# Patient Record
Sex: FEMALE | URBAN - METROPOLITAN AREA
[De-identification: names, ages, dates, MRNs, and addresses within clinical notes are randomized per-mention and may not be internally consistent; named-entity substitution may affect disease eponyms.]

---

## 2021-08-04 ENCOUNTER — APPOINTMENT (OUTPATIENT)
Dept: URBAN - METROPOLITAN AREA CLINIC 256 | Age: 46
Setting detail: DERMATOLOGY
End: 2021-08-04

## 2021-08-04 VITALS — WEIGHT: 170 LBS | HEIGHT: 62 IN | RESPIRATION RATE: 16 BRPM

## 2021-08-04 DIAGNOSIS — L20.89 OTHER ATOPIC DERMATITIS: ICD-10-CM

## 2021-08-04 PROBLEM — L30.9 DERMATITIS, UNSPECIFIED: Status: ACTIVE | Noted: 2021-08-04

## 2021-08-04 PROBLEM — D48.5 NEOPLASM OF UNCERTAIN BEHAVIOR OF SKIN: Status: ACTIVE | Noted: 2021-08-04

## 2021-08-04 PROCEDURE — OTHER ADDITIONAL NOTES: OTHER

## 2021-08-04 PROCEDURE — OTHER BIOPSY BY SHAVE METHOD: OTHER

## 2021-08-04 PROCEDURE — 88305 TISSUE EXAM BY PATHOLOGIST: CPT

## 2021-08-04 PROCEDURE — 99203 OFFICE O/P NEW LOW 30 MIN: CPT | Mod: 25

## 2021-08-04 PROCEDURE — OTHER COUNSELING: OTHER

## 2021-08-04 PROCEDURE — OTHER PRESCRIPTION: OTHER

## 2021-08-04 PROCEDURE — OTHER PATHOLOGY BILLING: OTHER

## 2021-08-04 PROCEDURE — 11102 TANGNTL BX SKIN SINGLE LES: CPT

## 2021-08-04 RX ORDER — PREDNISONE 20 MG/1
TABLET ORAL
Qty: 18 | Refills: 0 | Status: ERX | COMMUNITY
Start: 2021-08-04

## 2021-08-04 RX ORDER — TRIAMCINOLONE ACETONIDE 1 MG/G
CREAM TOPICAL BID
Qty: 1 | Refills: 3 | Status: ERX | COMMUNITY
Start: 2021-08-04

## 2021-08-04 RX ORDER — DESONIDE 0.5 MG/G
CREAM TOPICAL BID
Qty: 1 | Refills: 3 | Status: ERX | COMMUNITY
Start: 2021-08-04

## 2021-08-04 ASSESSMENT — LOCATION DETAILED DESCRIPTION DERM
LOCATION DETAILED: LEFT PROXIMAL PRETIBIAL REGION
LOCATION DETAILED: RIGHT PROXIMAL PRETIBIAL REGION
LOCATION DETAILED: RIGHT ANTECUBITAL SKIN
LOCATION DETAILED: LEFT ANTECUBITAL SKIN

## 2021-08-04 ASSESSMENT — LOCATION SIMPLE DESCRIPTION DERM
LOCATION SIMPLE: LEFT PRETIBIAL REGION
LOCATION SIMPLE: RIGHT UPPER ARM
LOCATION SIMPLE: RIGHT PRETIBIAL REGION
LOCATION SIMPLE: LEFT UPPER ARM

## 2021-08-04 ASSESSMENT — LOCATION ZONE DERM
LOCATION ZONE: ARM
LOCATION ZONE: LEG

## 2021-08-04 NOTE — PROCEDURE: PATHOLOGY BILLING
Immunohistochemistry ([id number] and [id number]) billing is not performed here. Please use the Immunohistochemistry Stain Billing plan to accomplish this. Immunohistochemistry ([id number] and [id number]) billing is not performed here. Please use the Immunohistochemistry Stain Billing plan to accomplish this.

## 2021-08-04 NOTE — PROCEDURE: PATHOLOGY BILLING
Rendering Text In Billing: The slides will be read by [employer] and reported in the attached document. Rendering Text In Billing: The slides will be read by [employer] and reported in the attached document.

## 2021-08-04 NOTE — PROCEDURE: BIOPSY BY SHAVE METHOD
Bill For Surgical Tray: no
Hemostasis: Drysol
Information: Selecting Yes will display possible errors in your note based on the variables you have selected. This validation is only offered as a suggestion for you. PLEASE NOTE THAT THE VALIDATION TEXT WILL BE REMOVED WHEN YOU FINALIZE YOUR NOTE. IF YOU WANT TO FAX A PRELIMINARY NOTE YOU WILL NEED TO TOGGLE THIS TO 'NO' IF YOU DO NOT WANT IT IN YOUR FAXED NOTE.
Size Of Lesion In Cm: 0
Notification Instructions: Patient will be notified of biopsy results. However, patient instructed to call the office if not contacted within 2 weeks.
Electrodesiccation And Curettage Text: The wound bed was treated with electrodesiccation and curettage after the biopsy was performed.
Type Of Destruction Used: Curettage
Anesthesia Type: 1% lidocaine with epinephrine
Cryotherapy Text: The wound bed was treated with cryotherapy after the biopsy was performed.
Depth Of Biopsy: dermis
Dressing: bandage
Biopsy Type: H and E
Billing Type: Client Bill
Anesthesia Volume In Cc (Will Not Render If 0): 0.5
Detail Level: Detailed
Silver Nitrate Text: The wound bed was treated with silver nitrate after the biopsy was performed.
Was A Bandage Applied: Yes
Biopsy Method: Dermablade
Post-Care Instructions: I reviewed with the patient in detail post-care instructions. Patient is to keep the biopsy site dry overnight, and then apply bacitracin twice daily until healed. Patient may apply hydrogen peroxide soaks to remove any crusting.
Electrodesiccation Text: The wound bed was treated with electrodesiccation after the biopsy was performed.
Wound Care: Petrolatum
Consent: Written consent was obtained and risks were reviewed including but not limited to scarring, infection, bleeding, scabbing, incomplete removal, nerve damage and allergy to anesthesia.
Curettage Text: The wound bed was treated with curettage after the biopsy was performed.

## 2021-08-04 NOTE — HPI: RASH
What Type Of Note Output Would You Prefer (Optional)?: Standard Output
Is This A New Presentation, Or A Follow-Up?: Rash
Additional History: Rash for a few months. Started out a small spot on her shin. Very itchy. She has tried HC cream which helps and she takes Benadryl as well, also has done some ibuprofen. Also tried some lotion. She tried to change her antihistamine from zyrtec to Allegra and thinks this worsened it, then she switched back and it helped. 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 \\Rosario has a spot on her face that’s been there for years and it’s now itchy, she isn’t sure if it’s related or not. She treated it for ringworm and eczema and neither worked. She also tried neosporin but she is allergic to this.

## 2021-08-04 NOTE — PROCEDURE: ADDITIONAL NOTES
Render Risk Assessment In Note?: no
Detail Level: Simple
Additional Notes: Reviewed risks of prednisone including but not limited to increasing blood sugar (patient not diabetic), risk of irritability and ferny. Patient advised to take in AMs, discussed risk of insomnia. Patient wishes to proceed. Told patient to stop taking and call clinic right away if any severe SEs develop \\n\\nPatient may not take prednisone as she felt anxious about it in the past. Told her okay to trial topicals only. Discussed appears like follicular eczema on leg and more like psoriasis by eyes, may consider biopsy at follow up.\\n\\nA clinical assistant was present for the exam. Care instructions of biopsied site were explained to the patient in detail. Told patient to call with any concerns or questions. The patient verbalized understanding and agreement of the education provided and the treatment plan. Encouraged patient to schedule a follow up appointment right after visit. At the end of the visit, all questions had been answered and the patient was satisfied with the visit.

## 2021-08-18 ENCOUNTER — APPOINTMENT (OUTPATIENT)
Dept: URBAN - METROPOLITAN AREA CLINIC 253 | Age: 46
Setting detail: DERMATOLOGY
End: 2021-08-18

## 2021-08-18 VITALS — RESPIRATION RATE: 15 BRPM | WEIGHT: 170 LBS | HEIGHT: 62 IN

## 2021-08-18 DIAGNOSIS — D22 MELANOCYTIC NEVI: ICD-10-CM

## 2021-08-18 DIAGNOSIS — L24 IRRITANT CONTACT DERMATITIS: ICD-10-CM

## 2021-08-18 PROBLEM — L30.9 DERMATITIS, UNSPECIFIED: Status: ACTIVE | Noted: 2021-08-18

## 2021-08-18 PROBLEM — C44.319 BASAL CELL CARCINOMA OF SKIN OF OTHER PARTS OF FACE: Status: ACTIVE | Noted: 2021-08-18

## 2021-08-18 PROBLEM — D48.5 NEOPLASM OF UNCERTAIN BEHAVIOR OF SKIN: Status: ACTIVE | Noted: 2021-08-18

## 2021-08-18 PROCEDURE — OTHER PATHOLOGY BILLING: OTHER

## 2021-08-18 PROCEDURE — 88305 TISSUE EXAM BY PATHOLOGIST: CPT

## 2021-08-18 PROCEDURE — OTHER ADDITIONAL NOTES: OTHER

## 2021-08-18 PROCEDURE — 11103 TANGNTL BX SKIN EA SEP/ADDL: CPT

## 2021-08-18 PROCEDURE — 99213 OFFICE O/P EST LOW 20 MIN: CPT | Mod: 25

## 2021-08-18 PROCEDURE — OTHER BIOPSY BY SHAVE METHOD: OTHER

## 2021-08-18 PROCEDURE — 11102 TANGNTL BX SKIN SINGLE LES: CPT

## 2021-08-18 PROCEDURE — OTHER PRESCRIPTION: OTHER

## 2021-08-18 PROCEDURE — OTHER COUNSELING: OTHER

## 2021-08-18 RX ORDER — CLOBETASOL PROPIONATE 0.5 MG/G
0.05% CREAM TOPICAL BID
Qty: 1 | Refills: 3 | Status: ERX | COMMUNITY
Start: 2021-08-18

## 2021-08-18 ASSESSMENT — LOCATION SIMPLE DESCRIPTION DERM
LOCATION SIMPLE: LEFT PRETIBIAL REGION
LOCATION SIMPLE: RIGHT UPPER BACK
LOCATION SIMPLE: UPPER BACK

## 2021-08-18 ASSESSMENT — LOCATION DETAILED DESCRIPTION DERM
LOCATION DETAILED: RIGHT SUPERIOR UPPER BACK
LOCATION DETAILED: LEFT PROXIMAL PRETIBIAL REGION
LOCATION DETAILED: SUPERIOR THORACIC SPINE

## 2021-08-18 ASSESSMENT — LOCATION ZONE DERM
LOCATION ZONE: TRUNK
LOCATION ZONE: LEG

## 2021-08-18 NOTE — PROCEDURE: BIOPSY BY SHAVE METHOD
Validate Note Data (See Information Below): No
Was A Bandage Applied: Yes
Additional Anesthesia Volume In Cc (Will Not Render If 0): 0
Curettage Text: The wound bed was treated with curettage after the biopsy was performed.
Hemostasis: Drysol
Notification Instructions: Patient will be notified of biopsy results. However, patient instructed to call the office if not contacted within 2 weeks.
Wound Care: Petrolatum
Depth Of Biopsy: dermis
Biopsy Method: Dermablade
Biopsy Type: H and E
Electrodesiccation And Curettage Text: The wound bed was treated with electrodesiccation and curettage after the biopsy was performed.
Cryotherapy Text: The wound bed was treated with cryotherapy after the biopsy was performed.
Anesthesia Volume In Cc (Will Not Render If 0): 0.5
Dressing: bandage
Information: Selecting Yes will display possible errors in your note based on the variables you have selected. This validation is only offered as a suggestion for you. PLEASE NOTE THAT THE VALIDATION TEXT WILL BE REMOVED WHEN YOU FINALIZE YOUR NOTE. IF YOU WANT TO FAX A PRELIMINARY NOTE YOU WILL NEED TO TOGGLE THIS TO 'NO' IF YOU DO NOT WANT IT IN YOUR FAXED NOTE.
Silver Nitrate Text: The wound bed was treated with silver nitrate after the biopsy was performed.
Electrodesiccation Text: The wound bed was treated with electrodesiccation after the biopsy was performed.
Billing Type: Client Bill
Type Of Destruction Used: Curettage
Post-Care Instructions: I reviewed with the patient in detail post-care instructions. Patient is to keep the biopsy site dry overnight, and then apply bacitracin twice daily until healed. Patient may apply hydrogen peroxide soaks to remove any crusting.
Detail Level: Detailed
Consent: Written consent was obtained and risks were reviewed including but not limited to scarring, infection, bleeding, scabbing, incomplete removal, nerve damage and allergy to anesthesia.
Anesthesia Type: 1% lidocaine with epinephrine

## 2021-08-18 NOTE — PROCEDURE: PATHOLOGY BILLING
Rendering Text In Billing: The slides will be read by [employer] and reported in the attached document. Rendering Text In Billing: The slides will be read by [email or web address] and reported in the attached document.

## 2021-08-18 NOTE — PROCEDURE: ADDITIONAL NOTES
Additional Notes: Recommended FBE \\nPatient may do this with Mohs suture removal at [facility] as she lives in [place]\\n\\nA clinical assistant was present for the exam. Care instructions of biopsied sites were explained to the patient in detail. Told patient to call with any concerns or questions. The patient verbalized understanding and agreement of the education provided and the treatment plan. Encouraged patient to schedule a follow up appointment right after visit. At the end of the visit, all questions had been answered and the patient was satisfied with the visit. Additional Notes: Recommended FBE \\nPatient may do this with Mohs suture removal at [facility] as she lives in [place]\\n\\nA clinical assistant was present for the exam. Care instructions of biopsied sites were explained to the patient in detail. Told patient to call with any concerns or questions. The patient verbalized understanding and agreement of the education provided and the treatment plan. Encouraged patient to schedule a follow up appointment right after visit. At the end of the visit, all questions had been answered and the patient was satisfied with the visit.

## 2021-08-18 NOTE — HPI: RASH
What Type Of Note Output Would You Prefer (Optional)?: Standard Output
Is This A New Presentation, Or A Follow-Up?: Rash
Additional History: Rash follow up she did take the prednisone. She had no side effects until she was tapering off and then she had headaches and arm pain. She had some nausea from it as well. The rash is much better except for now there’s a new spot flaring up. She does come into contact with betadine and gets reactions from this. She has had reactions to detergents as well. She is allergic to lots of ingredients.

## 2021-08-18 NOTE — PROCEDURE: ADDITIONAL NOTES
Additional Notes: Continue with antihistamine. If not resolved or worsens and not relieved by topical steroids, RTC for reevaluation and will consider a  biopsy.

## 2021-08-18 NOTE — PROCEDURE: PATHOLOGY BILLING
Immunohistochemistry ([id number] and [id number]) billing is not performed here. Please use the Immunohistochemistry Stain Billing plan to accomplish this. Immunohistochemistry ([id number] and [id number]) billing is not performed here. Please use the Immunohistochemistry Stain Billing plan to accomplish this.

## 2021-08-18 NOTE — PROCEDURE: ADDITIONAL NOTES
Additional Notes: Discussed path results and mohs vs SRT. Patient prefers Mohs. Will be sure [facility] staff contacts her for scheduling. Recommended FBE.

## 2021-09-28 ENCOUNTER — APPOINTMENT (OUTPATIENT)
Dept: URBAN - METROPOLITAN AREA CLINIC 255 | Age: 46
Setting detail: DERMATOLOGY
End: 2021-10-09

## 2021-09-28 PROBLEM — C44.319 BASAL CELL CARCINOMA OF SKIN OF OTHER PARTS OF FACE: Status: ACTIVE | Noted: 2021-09-28

## 2021-09-28 PROCEDURE — 17312 MOHS ADDL STAGE: CPT

## 2021-09-28 PROCEDURE — 14041 TIS TRNFR F/C/C/M/N/A/G/H/F: CPT

## 2021-09-28 PROCEDURE — OTHER MIPS QUALITY: OTHER

## 2021-09-28 PROCEDURE — 17311 MOHS 1 STAGE H/N/HF/G: CPT

## 2021-09-28 PROCEDURE — OTHER MOHS SURGERY: OTHER

## 2021-09-28 NOTE — PROCEDURE: MIPS QUALITY
Quality 431: Preventive Care And Screening: Unhealthy Alcohol Use - Screening: Patient not identified as an unhealthy alcohol user when screened for unhealthy alcohol use using a systematic screening method
Quality 130: Documentation Of Current Medications In The Medical Record: Current Medications Documented
Detail Level: Detailed
Quality 143: Oncology: Medical And Radiation- Pain Intensity Quantified: Pain severity quantified, no pain present
Quality 110: Preventive Care And Screening: Influenza Immunization: Influenza Immunization previously received during influenza season
Quality 226: Preventive Care And Screening: Tobacco Use: Screening And Cessation Intervention: Patient screened for tobacco use and is an ex/non-smoker

## 2021-10-08 ENCOUNTER — APPOINTMENT (OUTPATIENT)
Dept: URBAN - METROPOLITAN AREA CLINIC 255 | Age: 46
Setting detail: DERMATOLOGY
End: 2021-10-09

## 2021-10-08 DIAGNOSIS — D22 MELANOCYTIC NEVI: ICD-10-CM

## 2021-10-08 DIAGNOSIS — L82.1 OTHER SEBORRHEIC KERATOSIS: ICD-10-CM

## 2021-10-08 DIAGNOSIS — Z48.02 ENCOUNTER FOR REMOVAL OF SUTURES: ICD-10-CM

## 2021-10-08 DIAGNOSIS — L81.4 OTHER MELANIN HYPERPIGMENTATION: ICD-10-CM

## 2021-10-08 DIAGNOSIS — Z86.006 PERSONAL HISTORY OF MELANOMA IN-SITU: ICD-10-CM

## 2021-10-08 DIAGNOSIS — Z85.820 PERSONAL HISTORY OF MALIGNANT MELANOMA OF SKIN: ICD-10-CM

## 2021-10-08 DIAGNOSIS — D18.0 HEMANGIOMA: ICD-10-CM

## 2021-10-08 DIAGNOSIS — Z85.828 PERSONAL HISTORY OF OTHER MALIGNANT NEOPLASM OF SKIN: ICD-10-CM

## 2021-10-08 PROBLEM — D23.72 OTHER BENIGN NEOPLASM OF SKIN OF LEFT LOWER LIMB, INCLUDING HIP: Status: ACTIVE | Noted: 2021-10-08

## 2021-10-08 PROBLEM — D22.5 MELANOCYTIC NEVI OF TRUNK: Status: ACTIVE | Noted: 2021-10-08

## 2021-10-08 PROBLEM — D18.01 HEMANGIOMA OF SKIN AND SUBCUTANEOUS TISSUE: Status: ACTIVE | Noted: 2021-10-08

## 2021-10-08 PROCEDURE — OTHER SUTURE REMOVAL (GLOBAL PERIOD): OTHER

## 2021-10-08 PROCEDURE — 99214 OFFICE O/P EST MOD 30 MIN: CPT

## 2021-10-08 PROCEDURE — OTHER MIPS QUALITY: OTHER

## 2021-10-08 PROCEDURE — OTHER COUNSELING: OTHER

## 2021-10-08 ASSESSMENT — LOCATION DETAILED DESCRIPTION DERM
LOCATION DETAILED: RIGHT SUPERIOR MEDIAL UPPER BACK
LOCATION DETAILED: LEFT SUPRAPUBIC SKIN
LOCATION DETAILED: RIGHT MEDIAL UPPER BACK
LOCATION DETAILED: RIGHT INFERIOR MEDIAL UPPER BACK
LOCATION DETAILED: SUPERIOR THORACIC SPINE
LOCATION DETAILED: LEFT INFERIOR FOREHEAD
LOCATION DETAILED: LEFT SUPERIOR MEDIAL UPPER BACK
LOCATION DETAILED: RIGHT SUPERIOR UPPER BACK

## 2021-10-08 ASSESSMENT — LOCATION SIMPLE DESCRIPTION DERM
LOCATION SIMPLE: LEFT UPPER BACK
LOCATION SIMPLE: LEFT FOREHEAD
LOCATION SIMPLE: UPPER BACK
LOCATION SIMPLE: RIGHT UPPER BACK
LOCATION SIMPLE: GROIN

## 2021-10-08 ASSESSMENT — LOCATION ZONE DERM
LOCATION ZONE: FACE
LOCATION ZONE: TRUNK
LOCATION ZONE: TRUNK

## 2021-10-08 NOTE — PROCEDURE: MIPS QUALITY
Detail Level: Detailed
Quality 226: Preventive Care And Screening: Tobacco Use: Screening And Cessation Intervention: Patient screened for tobacco use and is an ex/non-smoker
Quality 130: Documentation Of Current Medications In The Medical Record: Current Medications Documented
Quality 431: Preventive Care And Screening: Unhealthy Alcohol Use - Screening: Patient not identified as an unhealthy alcohol user when screened for unhealthy alcohol use using a systematic screening method
Quality 110: Preventive Care And Screening: Influenza Immunization: Influenza Immunization not Administered because Patient Refused.

## 2021-10-08 NOTE — PROCEDURE: SUTURE REMOVAL (GLOBAL PERIOD)
Body Location Override (Optional - Billing Will Still Be Based On Selected Body Map Location If Applicable): right forehead
Add 14988 Cpt? (Important Note: In 2017 The Use Of 95112 Is Being Tracked By Cms To Determine Future Global Period Reimbursement For Global Periods): no
Detail Level: Detailed

## 2022-01-10 ENCOUNTER — APPOINTMENT (OUTPATIENT)
Dept: URBAN - METROPOLITAN AREA CLINIC 255 | Age: 47
Setting detail: DERMATOLOGY
End: 2022-01-14

## 2022-01-10 DIAGNOSIS — D22 MELANOCYTIC NEVI: ICD-10-CM

## 2022-01-10 DIAGNOSIS — L81.4 OTHER MELANIN HYPERPIGMENTATION: ICD-10-CM

## 2022-01-10 DIAGNOSIS — L57.8 OTHER SKIN CHANGES DUE TO CHRONIC EXPOSURE TO NONIONIZING RADIATION: ICD-10-CM

## 2022-01-10 DIAGNOSIS — D18.0 HEMANGIOMA: ICD-10-CM

## 2022-01-10 DIAGNOSIS — L20.89 OTHER ATOPIC DERMATITIS: ICD-10-CM

## 2022-01-10 DIAGNOSIS — Z85.828 PERSONAL HISTORY OF OTHER MALIGNANT NEOPLASM OF SKIN: ICD-10-CM

## 2022-01-10 DIAGNOSIS — Z71.89 OTHER SPECIFIED COUNSELING: ICD-10-CM

## 2022-01-10 DIAGNOSIS — L82.1 OTHER SEBORRHEIC KERATOSIS: ICD-10-CM

## 2022-01-10 DIAGNOSIS — Z85.820 PERSONAL HISTORY OF MALIGNANT MELANOMA OF SKIN: ICD-10-CM

## 2022-01-10 DIAGNOSIS — Z86.006 PERSONAL HISTORY OF MELANOMA IN-SITU: ICD-10-CM

## 2022-01-10 PROBLEM — D18.01 HEMANGIOMA OF SKIN AND SUBCUTANEOUS TISSUE: Status: ACTIVE | Noted: 2022-01-10

## 2022-01-10 PROBLEM — D22.71 MELANOCYTIC NEVI OF RIGHT LOWER LIMB, INCLUDING HIP: Status: ACTIVE | Noted: 2022-01-10

## 2022-01-10 PROBLEM — D22.5 MELANOCYTIC NEVI OF TRUNK: Status: ACTIVE | Noted: 2022-01-10

## 2022-01-10 PROBLEM — L20.84 INTRINSIC (ALLERGIC) ECZEMA: Status: ACTIVE | Noted: 2022-01-10

## 2022-01-10 PROCEDURE — OTHER COUNSELING: OTHER

## 2022-01-10 PROCEDURE — OTHER DEFER: OTHER

## 2022-01-10 PROCEDURE — OTHER PRESCRIPTION MEDICATION MANAGEMENT: OTHER

## 2022-01-10 PROCEDURE — OTHER MIPS QUALITY: OTHER

## 2022-01-10 PROCEDURE — 99214 OFFICE O/P EST MOD 30 MIN: CPT

## 2022-01-10 ASSESSMENT — LOCATION SIMPLE DESCRIPTION DERM
LOCATION SIMPLE: LEFT PRETIBIAL REGION
LOCATION SIMPLE: UPPER BACK
LOCATION SIMPLE: RIGHT GREAT TOE
LOCATION SIMPLE: RIGHT HAND
LOCATION SIMPLE: LEFT FOREHEAD
LOCATION SIMPLE: RIGHT UPPER BACK
LOCATION SIMPLE: UPPER BACK
LOCATION SIMPLE: LEFT UPPER BACK
LOCATION SIMPLE: LEFT HAND

## 2022-01-10 ASSESSMENT — LOCATION ZONE DERM
LOCATION ZONE: TRUNK
LOCATION ZONE: FACE
LOCATION ZONE: HAND
LOCATION ZONE: TOE
LOCATION ZONE: TRUNK
LOCATION ZONE: LEG

## 2022-01-10 ASSESSMENT — LOCATION DETAILED DESCRIPTION DERM
LOCATION DETAILED: RIGHT SUPERIOR UPPER BACK
LOCATION DETAILED: SUPERIOR THORACIC SPINE
LOCATION DETAILED: LEFT ULNAR DORSAL HAND
LOCATION DETAILED: LEFT MEDIAL UPPER BACK
LOCATION DETAILED: RIGHT RADIAL DORSAL HAND
LOCATION DETAILED: LEFT INFERIOR MEDIAL UPPER BACK
LOCATION DETAILED: LEFT FOREHEAD
LOCATION DETAILED: INFERIOR THORACIC SPINE
LOCATION DETAILED: RIGHT SUPERIOR MEDIAL UPPER BACK
LOCATION DETAILED: RIGHT DISTAL PLANTAR GREAT TOE
LOCATION DETAILED: LEFT RADIAL PALM
LOCATION DETAILED: LEFT PROXIMAL PRETIBIAL REGION
LOCATION DETAILED: RIGHT RADIAL PALM

## 2022-01-10 NOTE — PROCEDURE: DEFER
Other Procedure: Fraxel 15/50
Detail Level: Detailed
Procedure To Be Performed At Next Visit: Laser: Fraxel Re:pair
Introduction Text (Please End With A Colon): The following procedure was deferred:

## 2022-01-10 NOTE — PROCEDURE: MIPS QUALITY
Quality 226: Preventive Care And Screening: Tobacco Use: Screening And Cessation Intervention: Patient screened for tobacco use and is an ex/non-smoker
Detail Level: Generalized
Quality 130: Documentation Of Current Medications In The Medical Record: Current Medications Documented
Quality 110: Preventive Care And Screening: Influenza Immunization: Influenza Immunization not Administered because Patient Refused.
Quality 431: Preventive Care And Screening: Unhealthy Alcohol Use - Screening: Patient not identified as an unhealthy alcohol user when screened for unhealthy alcohol use using a systematic screening method

## 2022-01-10 NOTE — PROCEDURE: PRESCRIPTION MEDICATION MANAGEMENT
Continue Regimen: Continue to apply a thin layer of Triamcinolone to thinner skin on backs of hands and on shin before using stronger clobetasol.
Render In Strict Bullet Format?: No
Detail Level: Zone

## 2022-03-21 ENCOUNTER — APPOINTMENT (OUTPATIENT)
Dept: URBAN - METROPOLITAN AREA CLINIC 255 | Age: 47
Setting detail: DERMATOLOGY
End: 2022-04-03

## 2022-03-21 DIAGNOSIS — D22 MELANOCYTIC NEVI: ICD-10-CM

## 2022-03-21 DIAGNOSIS — L20.89 OTHER ATOPIC DERMATITIS: ICD-10-CM

## 2022-03-21 DIAGNOSIS — L82.1 OTHER SEBORRHEIC KERATOSIS: ICD-10-CM

## 2022-03-21 DIAGNOSIS — L81.4 OTHER MELANIN HYPERPIGMENTATION: ICD-10-CM

## 2022-03-21 DIAGNOSIS — D18.0 HEMANGIOMA: ICD-10-CM

## 2022-03-21 DIAGNOSIS — Z86.006 PERSONAL HISTORY OF MELANOMA IN-SITU: ICD-10-CM

## 2022-03-21 DIAGNOSIS — Z85.828 PERSONAL HISTORY OF OTHER MALIGNANT NEOPLASM OF SKIN: ICD-10-CM

## 2022-03-21 DIAGNOSIS — Z85.820 PERSONAL HISTORY OF MALIGNANT MELANOMA OF SKIN: ICD-10-CM

## 2022-03-21 PROBLEM — D18.01 HEMANGIOMA OF SKIN AND SUBCUTANEOUS TISSUE: Status: ACTIVE | Noted: 2022-03-21

## 2022-03-21 PROBLEM — L20.84 INTRINSIC (ALLERGIC) ECZEMA: Status: ACTIVE | Noted: 2022-03-21

## 2022-03-21 PROBLEM — D22.5 MELANOCYTIC NEVI OF TRUNK: Status: ACTIVE | Noted: 2022-03-21

## 2022-03-21 PROCEDURE — 99214 OFFICE O/P EST MOD 30 MIN: CPT

## 2022-03-21 PROCEDURE — OTHER COUNSELING: OTHER

## 2022-03-21 PROCEDURE — OTHER PRESCRIPTION MEDICATION MANAGEMENT: OTHER

## 2022-03-21 PROCEDURE — OTHER PRESCRIPTION: OTHER

## 2022-03-21 PROCEDURE — OTHER MIPS QUALITY: OTHER

## 2022-03-21 RX ORDER — AMOXICILLIN 500 MG/1
CAPSULE ORAL BID
Qty: 14 | Refills: 0 | Status: ERX | COMMUNITY
Start: 2022-03-21

## 2022-03-21 ASSESSMENT — LOCATION SIMPLE DESCRIPTION DERM
LOCATION SIMPLE: LEFT FOREHEAD
LOCATION SIMPLE: LEFT UPPER BACK
LOCATION SIMPLE: RIGHT UPPER BACK
LOCATION SIMPLE: UPPER BACK

## 2022-03-21 ASSESSMENT — LOCATION DETAILED DESCRIPTION DERM
LOCATION DETAILED: LEFT FOREHEAD
LOCATION DETAILED: RIGHT SUPERIOR UPPER BACK
LOCATION DETAILED: RIGHT INFERIOR MEDIAL UPPER BACK
LOCATION DETAILED: RIGHT MEDIAL UPPER BACK
LOCATION DETAILED: SUPERIOR THORACIC SPINE
LOCATION DETAILED: LEFT SUPERIOR MEDIAL UPPER BACK

## 2022-03-21 ASSESSMENT — LOCATION ZONE DERM
LOCATION ZONE: TRUNK
LOCATION ZONE: TRUNK
LOCATION ZONE: FACE

## 2022-03-21 NOTE — PROCEDURE: COUNSELING
When Should The Patient Follow-Up For Their Next Full-Body Skin Exam?: 3 Months
Detail Level: Detailed
Detail Level: Generalized
Quality 137: Melanoma: Continuity Of Care - Recall System: Patient information entered into a recall system that includes: target date for the next exam specified AND a process to follow up with patients regarding missed or unscheduled appointments
Patient Specific Counseling (Will Not Stick From Patient To Patient): Noted my concern that her persistent eczema, and its easy spread, may indicate bacterial colonization versus early impetiginization. \\n Recommended continuing present therapy and add a short course of amoxicillin.  \\nContinue using Cerave Cleanser, followed by Cerave Moisturizer
Detail Level: Simple

## 2022-03-21 NOTE — PROCEDURE: PRESCRIPTION MEDICATION MANAGEMENT
Continue Regimen: Continue using topical steroids BID to the affected areas until resolved.\\nIncrease Zyrtec to 2-3 tablets daily.
Detail Level: Zone
Render In Strict Bullet Format?: No

## 2022-03-21 NOTE — PROCEDURE: MIPS QUALITY
Quality 431: Preventive Care And Screening: Unhealthy Alcohol Use - Screening: Patient not identified as an unhealthy alcohol user when screened for unhealthy alcohol use using a systematic screening method
Quality 226: Preventive Care And Screening: Tobacco Use: Screening And Cessation Intervention: Patient screened for tobacco use and is an ex/non-smoker
Detail Level: Generalized
Quality 110: Preventive Care And Screening: Influenza Immunization: Influenza Immunization not Administered because Patient Refused.
Quality 130: Documentation Of Current Medications In The Medical Record: Current Medications Documented

## 2022-06-13 ENCOUNTER — APPOINTMENT (OUTPATIENT)
Dept: URBAN - METROPOLITAN AREA CLINIC 255 | Age: 47
Setting detail: DERMATOLOGY
End: 2022-06-19

## 2022-06-13 DIAGNOSIS — D22 MELANOCYTIC NEVI: ICD-10-CM

## 2022-06-13 DIAGNOSIS — Z85.820 PERSONAL HISTORY OF MALIGNANT MELANOMA OF SKIN: ICD-10-CM

## 2022-06-13 DIAGNOSIS — D18.0 HEMANGIOMA: ICD-10-CM

## 2022-06-13 DIAGNOSIS — L82.1 OTHER SEBORRHEIC KERATOSIS: ICD-10-CM

## 2022-06-13 DIAGNOSIS — Z85.828 PERSONAL HISTORY OF OTHER MALIGNANT NEOPLASM OF SKIN: ICD-10-CM

## 2022-06-13 DIAGNOSIS — L81.4 OTHER MELANIN HYPERPIGMENTATION: ICD-10-CM

## 2022-06-13 DIAGNOSIS — Z86.006 PERSONAL HISTORY OF MELANOMA IN-SITU: ICD-10-CM

## 2022-06-13 PROBLEM — D22.39 MELANOCYTIC NEVI OF OTHER PARTS OF FACE: Status: ACTIVE | Noted: 2022-06-13

## 2022-06-13 PROBLEM — D18.01 HEMANGIOMA OF SKIN AND SUBCUTANEOUS TISSUE: Status: ACTIVE | Noted: 2022-06-13

## 2022-06-13 PROBLEM — D22.5 MELANOCYTIC NEVI OF TRUNK: Status: ACTIVE | Noted: 2022-06-13

## 2022-06-13 PROCEDURE — OTHER MIPS QUALITY: OTHER

## 2022-06-13 PROCEDURE — OTHER COUNSELING: OTHER

## 2022-06-13 PROCEDURE — 99213 OFFICE O/P EST LOW 20 MIN: CPT

## 2022-06-13 ASSESSMENT — LOCATION DETAILED DESCRIPTION DERM
LOCATION DETAILED: SUPERIOR THORACIC SPINE
LOCATION DETAILED: RIGHT MEDIAL UPPER BACK
LOCATION DETAILED: LEFT SUPERIOR MEDIAL UPPER BACK
LOCATION DETAILED: LEFT FOREHEAD
LOCATION DETAILED: RIGHT INFERIOR MEDIAL UPPER BACK
LOCATION DETAILED: RIGHT SUPERIOR UPPER BACK

## 2022-06-13 ASSESSMENT — LOCATION ZONE DERM
LOCATION ZONE: TRUNK
LOCATION ZONE: FACE
LOCATION ZONE: TRUNK

## 2022-06-13 ASSESSMENT — LOCATION SIMPLE DESCRIPTION DERM
LOCATION SIMPLE: LEFT FOREHEAD
LOCATION SIMPLE: UPPER BACK
LOCATION SIMPLE: LEFT UPPER BACK
LOCATION SIMPLE: RIGHT UPPER BACK

## 2022-06-13 NOTE — PROCEDURE: MIPS QUALITY
Quality 137: Melanoma: Continuity Of Care - Recall System: Patient information entered into a recall system that includes: target date for the next exam specified AND a process to follow up with patients regarding missed or unscheduled appointments
Quality 431: Preventive Care And Screening: Unhealthy Alcohol Use - Screening: Patient not identified as an unhealthy alcohol user when screened for unhealthy alcohol use using a systematic screening method
Quality 226: Preventive Care And Screening: Tobacco Use: Screening And Cessation Intervention: Patient screened for tobacco use and is an ex/non-smoker
Detail Level: Generalized
Quality 138: Melanoma: Coordination Of Care: A treatment plan was communicated to the physicians providing continuing care within one month of diagnosis outlining: diagnosis, tumor thickness and a plan for surgery or alternate care.
Quality 397: Melanoma: Reporting: Pathology report includes the pT Category, thickness, ulceration and mitotic rate, peripheral and deep margin status and presence or absence of microsatellitosis for invasive tumors
Quality 130: Documentation Of Current Medications In The Medical Record: Current Medications Documented
Quality 110: Preventive Care And Screening: Influenza Immunization: Influenza Immunization not Administered because Patient Refused.

## 2022-06-13 NOTE — PROCEDURE: COUNSELING
When Should The Patient Follow-Up For Their Next Full-Body Skin Exam?: 3 Months
Detail Level: Detailed
Quality 137: Melanoma: Continuity Of Care - Recall System: Patient information entered into a recall system that includes: target date for the next exam specified AND a process to follow up with patients regarding missed or unscheduled appointments
Detail Level: Generalized
Detail Level: Simple

## 2023-01-13 NOTE — PROCEDURE: COUNSELING
Please contact patient regarding TB test.  Thank you  
Pt would like the [employer] gold.  Ordered and scheduled at lab.  
Detail Level: Detailed
Patient Specific Counseling (Will Not Stick From Patient To Patient): Recommended to use stronger topical clobetasol on thicker parts on hands until the eczema responds.
Detail Level: Generalized
When Should The Patient Follow-Up For Their Next Full-Body Skin Exam?: 3 Months
Quality 137: Melanoma: Continuity Of Care - Recall System: Patient information entered into a recall system that includes: target date for the next exam specified AND a process to follow up with patients regarding missed or unscheduled appointments
Detail Level: Simple

## 2023-07-17 ENCOUNTER — APPOINTMENT (OUTPATIENT)
Dept: URBAN - METROPOLITAN AREA CLINIC 255 | Age: 48
Setting detail: DERMATOLOGY
End: 2023-07-21

## 2023-07-17 VITALS — WEIGHT: 140 LBS | HEIGHT: 55 IN

## 2023-07-17 DIAGNOSIS — D18.0 HEMANGIOMA: ICD-10-CM

## 2023-07-17 DIAGNOSIS — D22 MELANOCYTIC NEVI: ICD-10-CM

## 2023-07-17 DIAGNOSIS — L81.4 OTHER MELANIN HYPERPIGMENTATION: ICD-10-CM

## 2023-07-17 DIAGNOSIS — L57.8 OTHER SKIN CHANGES DUE TO CHRONIC EXPOSURE TO NONIONIZING RADIATION: ICD-10-CM

## 2023-07-17 DIAGNOSIS — L82.1 OTHER SEBORRHEIC KERATOSIS: ICD-10-CM

## 2023-07-17 DIAGNOSIS — Z71.89 OTHER SPECIFIED COUNSELING: ICD-10-CM

## 2023-07-17 DIAGNOSIS — L20.89 OTHER ATOPIC DERMATITIS: ICD-10-CM

## 2023-07-17 PROBLEM — D22.5 MELANOCYTIC NEVI OF TRUNK: Status: ACTIVE | Noted: 2023-07-17

## 2023-07-17 PROBLEM — D18.01 HEMANGIOMA OF SKIN AND SUBCUTANEOUS TISSUE: Status: ACTIVE | Noted: 2023-07-17

## 2023-07-17 PROCEDURE — OTHER PRESCRIPTION MEDICATION MANAGEMENT: OTHER

## 2023-07-17 PROCEDURE — OTHER COUNSELING: OTHER

## 2023-07-17 PROCEDURE — 99214 OFFICE O/P EST MOD 30 MIN: CPT

## 2023-07-17 PROCEDURE — OTHER MIPS QUALITY: OTHER

## 2023-07-17 PROCEDURE — OTHER PRESCRIPTION: OTHER

## 2023-07-17 RX ORDER — TRIAMCINOLONE ACETONIDE 1 MG/G
CREAM TOPICAL BID
Qty: 453.6 | Refills: 3 | Status: ERX

## 2023-07-17 RX ORDER — CLOBETASOL PROPIONATE 0.5 MG/ML
SOLUTION TOPICAL BID
Qty: 50 | Refills: 3 | Status: ERX | COMMUNITY
Start: 2023-07-17

## 2023-07-17 RX ORDER — DESONIDE 0.5 MG/G
CREAM TOPICAL PRN
Qty: 60 | Refills: 3 | Status: ERX

## 2023-07-17 ASSESSMENT — LOCATION ZONE DERM
LOCATION ZONE: TRUNK
LOCATION ZONE: LEG

## 2023-07-17 ASSESSMENT — LOCATION DETAILED DESCRIPTION DERM
LOCATION DETAILED: LEFT SUPERIOR MEDIAL UPPER BACK
LOCATION DETAILED: LEFT PROXIMAL PRETIBIAL REGION
LOCATION DETAILED: LEFT MEDIAL UPPER BACK

## 2023-07-17 ASSESSMENT — LOCATION SIMPLE DESCRIPTION DERM
LOCATION SIMPLE: LEFT UPPER BACK
LOCATION SIMPLE: LEFT PRETIBIAL REGION

## 2023-07-17 NOTE — PROCEDURE: PRESCRIPTION MEDICATION MANAGEMENT
Render In Strict Bullet Format?: No
Defer Treatment (Provide Reason For Deferment In Text Field Below): Dupixent discussed today and name of medication given to patient, she will consider this and let us know at her next visit if she wishes to start.
Detail Level: Zone

## 2023-07-21 RX ORDER — CLOBETASOL PROPIONATE 0.5 MG/ML
SOLUTION TOPICAL
Qty: 50 | Refills: 3 | Status: ERX

## 2023-07-21 RX ORDER — FLUOCINOLONE ACETONIDE 0.11 MG/ML
OIL TOPICAL
Qty: 118.28 | Refills: 3 | Status: ERX | COMMUNITY
Start: 2023-07-21

## 2023-11-24 ENCOUNTER — APPOINTMENT (OUTPATIENT)
Dept: URBAN - METROPOLITAN AREA CLINIC 252 | Age: 48
Setting detail: DERMATOLOGY
End: 2023-11-24

## 2023-11-24 VITALS — HEIGHT: 62 IN | WEIGHT: 150 LBS

## 2023-11-24 DIAGNOSIS — L21.8 OTHER SEBORRHEIC DERMATITIS: ICD-10-CM

## 2023-11-24 DIAGNOSIS — L65.0 TELOGEN EFFLUVIUM: ICD-10-CM

## 2023-11-24 PROCEDURE — OTHER PRESCRIPTION: OTHER

## 2023-11-24 PROCEDURE — 99214 OFFICE O/P EST MOD 30 MIN: CPT

## 2023-11-24 PROCEDURE — OTHER COUNSELING: OTHER

## 2023-11-24 RX ORDER — ROFLUMILAST 3 MG/G
0.3% CREAM TOPICAL QD
Qty: 60 | Refills: 11 | Status: ERX | COMMUNITY
Start: 2023-11-24

## 2023-11-24 ASSESSMENT — LOCATION SIMPLE DESCRIPTION DERM
LOCATION SIMPLE: ANTERIOR SCALP
LOCATION SIMPLE: INFERIOR FOREHEAD

## 2023-11-24 ASSESSMENT — LOCATION ZONE DERM
LOCATION ZONE: FACE
LOCATION ZONE: SCALP

## 2023-11-24 ASSESSMENT — LOCATION DETAILED DESCRIPTION DERM
LOCATION DETAILED: INFERIOR MID FOREHEAD
LOCATION DETAILED: MID-FRONTAL SCALP

## 2023-11-24 NOTE — PROCEDURE: COUNSELING
Detail Level: Zone
Patient Specific Counseling (Will Not Stick From Patient To Patient): - Continue use of Desonide to face and Betamethasone to scalp for flares.\\n- Begin use of Zoryve every evening for maintenance.
Patient Specific Counseling (Will Not Stick From Patient To Patient): - Recommended holding off on beginning use of Minoxidil until patient's dermatitis has resolved. \\n- May consider a scalp biopsy and/or lab work. If not self corrected within 4 months. Patient expressed understanding.

## 2023-11-24 NOTE — HPI: HAIR LOSS
Additional History: Started having rashy skin on scalp that started about 3-4 months ago.  Using clobetasol solution qd 3 days in a row then the stops for 10-14 days. Uses despnode more often for her forehead dermatitis qd to bid for about 1 week then stip for 1-2 days then restarts.had a stressful life even leading up tp this\\n

## 2024-01-11 ENCOUNTER — APPOINTMENT (OUTPATIENT)
Dept: URBAN - METROPOLITAN AREA CLINIC 255 | Age: 49
Setting detail: DERMATOLOGY
End: 2024-01-11

## 2024-01-11 VITALS — WEIGHT: 140 LBS | HEIGHT: 62 IN

## 2024-01-11 VITALS — HEIGHT: 62 IN | WEIGHT: 140 LBS

## 2024-01-11 DIAGNOSIS — D22 MELANOCYTIC NEVI: ICD-10-CM

## 2024-01-11 DIAGNOSIS — Z85.828 PERSONAL HISTORY OF OTHER MALIGNANT NEOPLASM OF SKIN: ICD-10-CM

## 2024-01-11 DIAGNOSIS — L82.1 OTHER SEBORRHEIC KERATOSIS: ICD-10-CM

## 2024-01-11 DIAGNOSIS — L81.4 OTHER MELANIN HYPERPIGMENTATION: ICD-10-CM

## 2024-01-11 DIAGNOSIS — Z86.006 PERSONAL HISTORY OF MELANOMA IN-SITU: ICD-10-CM

## 2024-01-11 DIAGNOSIS — Z87.2 PERSONAL HISTORY OF DISEASES OF THE SKIN AND SUBCUTANEOUS TISSUE: ICD-10-CM

## 2024-01-11 DIAGNOSIS — L57.8 OTHER SKIN CHANGES DUE TO CHRONIC EXPOSURE TO NONIONIZING RADIATION: ICD-10-CM

## 2024-01-11 DIAGNOSIS — L21.8 OTHER SEBORRHEIC DERMATITIS: ICD-10-CM

## 2024-01-11 DIAGNOSIS — D18.0 HEMANGIOMA: ICD-10-CM

## 2024-01-11 DIAGNOSIS — Z85.820 PERSONAL HISTORY OF MALIGNANT MELANOMA OF SKIN: ICD-10-CM

## 2024-01-11 DIAGNOSIS — Z71.89 OTHER SPECIFIED COUNSELING: ICD-10-CM

## 2024-01-11 PROBLEM — D22.5 MELANOCYTIC NEVI OF TRUNK: Status: ACTIVE | Noted: 2024-01-11

## 2024-01-11 PROBLEM — D18.01 HEMANGIOMA OF SKIN AND SUBCUTANEOUS TISSUE: Status: ACTIVE | Noted: 2024-01-11

## 2024-01-11 PROCEDURE — OTHER PRESCRIPTION MEDICATION MANAGEMENT: OTHER

## 2024-01-11 PROCEDURE — 99213 OFFICE O/P EST LOW 20 MIN: CPT

## 2024-01-11 PROCEDURE — OTHER COUNSELING: OTHER

## 2024-01-11 PROCEDURE — OTHER PRESCRIPTION: OTHER

## 2024-01-11 PROCEDURE — OTHER MIPS QUALITY: OTHER

## 2024-01-11 RX ORDER — KETOCONAZOLE 20 MG/G
CREAM TOPICAL BID
Qty: 60 | Refills: 3 | Status: ERX | COMMUNITY
Start: 2024-01-11

## 2024-01-11 ASSESSMENT — LOCATION DETAILED DESCRIPTION DERM
LOCATION DETAILED: LEFT SUPERIOR MEDIAL UPPER BACK
LOCATION DETAILED: MONS PUBIS
LOCATION DETAILED: RIGHT SUPERIOR UPPER BACK
LOCATION DETAILED: LEFT FOREHEAD
LOCATION DETAILED: SUPERIOR THORACIC SPINE
LOCATION DETAILED: LEFT MEDIAL UPPER BACK

## 2024-01-11 ASSESSMENT — LOCATION SIMPLE DESCRIPTION DERM
LOCATION SIMPLE: LEFT FOREHEAD
LOCATION SIMPLE: LEFT UPPER BACK
LOCATION SIMPLE: UPPER BACK
LOCATION SIMPLE: RIGHT UPPER BACK
LOCATION SIMPLE: GROIN

## 2024-01-11 ASSESSMENT — LOCATION ZONE DERM
LOCATION ZONE: VULVA
LOCATION ZONE: TRUNK
LOCATION ZONE: FACE
LOCATION ZONE: TRUNK

## 2024-01-11 NOTE — PROCEDURE: COUNSELING
Detail Level: Generalized
When Should The Patient Follow-Up For Their Next Full-Body Skin Exam?: 6 Months
Quality 137: Melanoma: Continuity Of Care - Recall System: Patient information entered into a recall system that includes: target date for the next exam specified AND a process to follow up with patients regarding missed or unscheduled appointments
Detail Level: Detailed
Detail Level: Simple
Patient Specific Counseling (Will Not Stick From Patient To Patient): -Patient reports lesion has been present for years, unchanged and asymptomatic. Lesion unchanged compared to prior photograph. New photograph obtained today. Lesion measures 6 mm x 8mm. Should lesion grow, change in color or become symptomatic, RTC.

## 2024-01-11 NOTE — PROCEDURE: PRESCRIPTION MEDICATION MANAGEMENT
Detail Level: Zone
Initiate Treatment: Ketoconozole 2% cream: Apply to affected areas of face twice daily for up to 4 weeks or until resolved for flares, then use 2-3 times per week for maintenance
Render In Strict Bullet Format?: No
Continue Regimen: Desonide 2% cream BID \\nClobetosol solution 0.05% solution QD
Plan: Follow up in 1 month if not resolved or worse. May consider treating for perioral dermatitis.

## 2024-02-08 ENCOUNTER — APPOINTMENT (OUTPATIENT)
Dept: URBAN - METROPOLITAN AREA CLINIC 255 | Age: 49
Setting detail: DERMATOLOGY
End: 2024-02-08

## 2024-02-08 DIAGNOSIS — D49.2 NEOPLASM OF UNSPECIFIED BEHAVIOR OF BONE, SOFT TISSUE, AND SKIN: ICD-10-CM

## 2024-02-08 DIAGNOSIS — L71.0 PERIORAL DERMATITIS: ICD-10-CM

## 2024-02-08 DIAGNOSIS — L21.8 OTHER SEBORRHEIC DERMATITIS: ICD-10-CM

## 2024-02-08 PROBLEM — L20.89 OTHER ATOPIC DERMATITIS: Status: ACTIVE | Noted: 2024-02-08

## 2024-02-08 PROCEDURE — 99213 OFFICE O/P EST LOW 20 MIN: CPT | Mod: 25

## 2024-02-08 PROCEDURE — OTHER MIPS QUALITY: OTHER

## 2024-02-08 PROCEDURE — OTHER COUNSELING: OTHER

## 2024-02-08 PROCEDURE — OTHER BIOPSY BY SHAVE METHOD: OTHER

## 2024-02-08 PROCEDURE — OTHER PRESCRIPTION MEDICATION MANAGEMENT: OTHER

## 2024-02-08 PROCEDURE — 11102 TANGNTL BX SKIN SINGLE LES: CPT

## 2024-02-08 PROCEDURE — 11103 TANGNTL BX SKIN EA SEP/ADDL: CPT

## 2024-02-08 PROCEDURE — OTHER PRESCRIPTION: OTHER

## 2024-02-08 RX ORDER — KETOCONAZOLE 20 MG/G
CREAM TOPICAL BID
Qty: 60 | Refills: 3 | Status: ACTIVE

## 2024-02-08 RX ORDER — METRONIDAZOLE 7.5 MG/G
CREAM TOPICAL
Qty: 45 | Refills: 2 | Status: ERX | COMMUNITY
Start: 2024-02-08

## 2024-02-08 ASSESSMENT — LOCATION SIMPLE DESCRIPTION DERM
LOCATION SIMPLE: LEFT FOREHEAD
LOCATION SIMPLE: ABDOMEN
LOCATION SIMPLE: LEFT SCALP
LOCATION SIMPLE: RIGHT SCALP
LOCATION SIMPLE: CHEST

## 2024-02-08 ASSESSMENT — LOCATION DETAILED DESCRIPTION DERM
LOCATION DETAILED: LEFT RIB CAGE
LOCATION DETAILED: LEFT CENTRAL FRONTAL SCALP
LOCATION DETAILED: LEFT MEDIAL FRONTAL SCALP
LOCATION DETAILED: RIGHT CENTRAL FRONTAL SCALP
LOCATION DETAILED: LEFT LATERAL SUPERIOR CHEST
LOCATION DETAILED: LEFT SUPERIOR FOREHEAD
LOCATION DETAILED: RIGHT MEDIAL FRONTAL SCALP

## 2024-02-08 ASSESSMENT — LOCATION ZONE DERM
LOCATION ZONE: FACE
LOCATION ZONE: SCALP
LOCATION ZONE: TRUNK

## 2024-02-08 ASSESSMENT — INVESTIGATOR STATIC GLOBAL ASSESSMENT
IN YOUR EXPERIENCE, AMONG ALL PATIENTS YOU HAVE SEEN WITH THIS CONDITION, HOW SEVERE IS THIS PATIENT'S CONDITION?: MILD TO MODERATE

## 2024-02-08 NOTE — PROCEDURE: COUNSELING
Detail Level: Detailed
Patient Specific Counseling (Will Not Stick From Patient To Patient): -recommend diligent emollient use. Discussed CeraVe cream, Vanicream and Vaseline.

## 2024-02-08 NOTE — PROCEDURE: BIOPSY BY SHAVE METHOD
Type Of Destruction Used: Curettage
Lab Facility: [id number]
Biopsy Type: H and E
Hide Additional Anticipated Plan?: No
Path Notes Override (Will Replace All Of The Above Text): Please check margins.
Notification Instructions: Patient will be notified of biopsy results. However, patient instructed to call the office if not contacted within 2 weeks.
Hemostasis: Drysol
Depth Of Biopsy: dermis
Curettage Text: The wound bed was treated with curettage after the biopsy was performed.
Anesthesia Volume In Cc: 0.5
Biopsy Method: double edge Personna blade
Wound Care: Petrolatum
Cryotherapy Text: The wound bed was treated with cryotherapy after the biopsy was performed.
Billing Type: Third-Party Bill
Electrodesiccation Text: The wound bed was treated with electrodesiccation after the biopsy was performed.
Was A Bandage Applied: Yes
Electrodesiccation And Curettage Text: The wound bed was treated with electrodesiccation and curettage after the biopsy was performed.
Dressing: bandage
Lab: -[id number]
Consent: Written consent was obtained and risks were reviewed including but not limited to scarring, infection, bleeding, scabbing, incomplete removal, nerve damage and allergy to anesthesia.
Silver Nitrate Text: The wound bed was treated with silver nitrate after the biopsy was performed.
Information: Selecting Yes will display possible errors in your note based on the variables you have selected. This validation is only offered as a suggestion for you. PLEASE NOTE THAT THE VALIDATION TEXT WILL BE REMOVED WHEN YOU FINALIZE YOUR NOTE. IF YOU WANT TO FAX A PRELIMINARY NOTE YOU WILL NEED TO TOGGLE THIS TO 'NO' IF YOU DO NOT WANT IT IN YOUR FAXED NOTE.
Post-Care Instructions: I reviewed with the patient in detail post-care instructions. Patient is to keep the biopsy site dry overnight, and then apply bacitracin twice daily until healed. Patient may apply hydrogen peroxide soaks to remove any crusting.
Anesthesia Type: 1% lidocaine with epinephrine and a 1:10 solution of 8.4% sodium bicarbonate
Detail Level: Detailed

## 2024-02-08 NOTE — PROCEDURE: PRESCRIPTION MEDICATION MANAGEMENT
Detail Level: Zone
Initiate Treatment: Ketoconozole 2% cream: Apply to affected areas of face twice daily for up to 4 weeks or until resolved for flares, then use 2-3 times per week for maintenance
Render In Strict Bullet Format?: No
Continue Regimen: Desonide 2% cream BID \\nClobetosol solution 0.05% solution QD
Plan: Follow up in 1 month if not resolved or worse. May consider treating for perioral dermatitis.
Plan: Try topical medication for 2 months, may consider taking doxycycline if no improvement
Discontinue Regimen: Ketoconozole and desonide
Initiate Treatment: metronidazole 0.75 % topical cream : Apply to face once daily as needed for flares

## 2024-04-12 ENCOUNTER — APPOINTMENT (OUTPATIENT)
Dept: URBAN - METROPOLITAN AREA CLINIC 255 | Age: 49
Setting detail: DERMATOLOGY
End: 2024-04-16

## 2024-04-12 DIAGNOSIS — L20.89 OTHER ATOPIC DERMATITIS: ICD-10-CM

## 2024-04-12 PROCEDURE — OTHER DUPIXENT INITIATION: OTHER

## 2024-04-12 PROCEDURE — OTHER COUNSELING: OTHER

## 2024-04-12 PROCEDURE — OTHER PRESCRIPTION: OTHER

## 2024-04-12 PROCEDURE — OTHER PRESCRIPTION MEDICATION MANAGEMENT: OTHER

## 2024-04-12 PROCEDURE — OTHER MIPS QUALITY: OTHER

## 2024-04-12 PROCEDURE — 99214 OFFICE O/P EST MOD 30 MIN: CPT

## 2024-04-12 RX ORDER — DUPILUMAB 300 MG/2ML
INJECTION, SOLUTION SUBCUTANEOUS
Qty: 1 | Refills: 4 | Status: CANCELLED | COMMUNITY
Start: 2024-04-12

## 2024-04-12 RX ORDER — TACROLIMUS 1 MG/G
OINTMENT TOPICAL TWICE A DAY
Qty: 60 | Refills: 2 | Status: ERX | COMMUNITY
Start: 2024-04-12

## 2024-04-12 ASSESSMENT — ITCH NUMERIC RATING SCALE: HOW SEVERE IS YOUR ITCHING?: 8

## 2024-04-12 ASSESSMENT — SEVERITY ASSESSMENT 2020: SEVERITY 2020: MODERATE

## 2024-04-12 ASSESSMENT — BSA ECZEMA: % BODY COVERED IN ECZEMA: 10

## 2024-04-12 ASSESSMENT — LOCATION SIMPLE DESCRIPTION DERM
LOCATION SIMPLE: SCALP
LOCATION SIMPLE: RIGHT HAND
LOCATION SIMPLE: NECK
LOCATION SIMPLE: LEFT CHEEK

## 2024-04-12 ASSESSMENT — LOCATION ZONE DERM
LOCATION ZONE: HAND
LOCATION ZONE: FACE
LOCATION ZONE: SCALP
LOCATION ZONE: NECK

## 2024-04-12 ASSESSMENT — LOCATION DETAILED DESCRIPTION DERM
LOCATION DETAILED: RIGHT SUPERIOR LATERAL NECK
LOCATION DETAILED: RIGHT DORSAL MIDDLE FINGER METACARPOPHALANGEAL JOINT
LOCATION DETAILED: LEFT CENTRAL FRONTAL SCALP
LOCATION DETAILED: LEFT INFERIOR CENTRAL MALAR CHEEK

## 2024-04-12 NOTE — PROCEDURE: DUPIXENT INITIATION
Diagnosis (Required): Atopic Dermatitis/Eczematous Dermatitis
Is Azathioprine Contraindicated?: No
Is Phototherapy Contraindicated?: Yes
Detail Level: Zone
Pregnancy And Lactation Warning Text: There have not been adverse fetal risks in women taking Dupixent while pregnant. It is unknown if this medication is excreted in breast milk.
Dupixent Monitoring Guidelines: There is no laboratory monitoring requirement with Dupixent.
Dupixent Dosing: 600 mg SC day 0 then 300 mg SC every other week

## 2024-04-12 NOTE — PROCEDURE: PRESCRIPTION MEDICATION MANAGEMENT
Continue Regimen: Clobetasol 0.05% solution BID
Initiate Treatment: Tacrolimus 1% ointment BID
Plan: Patient will continue to use topical steroid and alternate with tacrolimus to prevent skin atrophy from long term use of a topical steroids. With patients' long-term history of eczema, sending a prescription for Dupixent to initiate prior authorization.  Once patient is approved, she will RTC for first dose and injection training.
Detail Level: Zone
Discontinue Regimen: Metronidazole
Render In Strict Bullet Format?: No

## 2024-04-18 ENCOUNTER — RX ONLY (RX ONLY)
Age: 49
End: 2024-04-18

## 2024-04-18 RX ORDER — DUPILUMAB 300 MG/2ML
INJECTION, SOLUTION SUBCUTANEOUS
Qty: 1 | Refills: 4 | Status: ERX | COMMUNITY
Start: 2024-04-18

## 2024-07-18 ENCOUNTER — APPOINTMENT (OUTPATIENT)
Dept: URBAN - METROPOLITAN AREA CLINIC 255 | Age: 49
Setting detail: DERMATOLOGY
End: 2024-07-19

## 2024-07-18 VITALS — HEIGHT: 62 IN | WEIGHT: 150 LBS

## 2024-07-18 DIAGNOSIS — D22 MELANOCYTIC NEVI: ICD-10-CM

## 2024-07-18 DIAGNOSIS — Z85.828 PERSONAL HISTORY OF OTHER MALIGNANT NEOPLASM OF SKIN: ICD-10-CM

## 2024-07-18 DIAGNOSIS — D18.0 HEMANGIOMA: ICD-10-CM

## 2024-07-18 DIAGNOSIS — Z86.006 PERSONAL HISTORY OF MELANOMA IN-SITU: ICD-10-CM

## 2024-07-18 DIAGNOSIS — L82.1 OTHER SEBORRHEIC KERATOSIS: ICD-10-CM

## 2024-07-18 DIAGNOSIS — Z87.2 PERSONAL HISTORY OF DISEASES OF THE SKIN AND SUBCUTANEOUS TISSUE: ICD-10-CM

## 2024-07-18 DIAGNOSIS — Z85.820 PERSONAL HISTORY OF MALIGNANT MELANOMA OF SKIN: ICD-10-CM

## 2024-07-18 DIAGNOSIS — L20.89 OTHER ATOPIC DERMATITIS: ICD-10-CM

## 2024-07-18 DIAGNOSIS — Z71.89 OTHER SPECIFIED COUNSELING: ICD-10-CM

## 2024-07-18 PROBLEM — D22.9 MELANOCYTIC NEVI, UNSPECIFIED: Status: ACTIVE | Noted: 2024-07-18

## 2024-07-18 PROBLEM — D18.01 HEMANGIOMA OF SKIN AND SUBCUTANEOUS TISSUE: Status: ACTIVE | Noted: 2024-07-18

## 2024-07-18 PROCEDURE — OTHER MIPS QUALITY: OTHER

## 2024-07-18 PROCEDURE — OTHER PATIENT SPECIFIC COUNSELING: OTHER

## 2024-07-18 PROCEDURE — OTHER PRESCRIPTION MEDICATION MANAGEMENT: OTHER

## 2024-07-18 PROCEDURE — OTHER COUNSELING: OTHER

## 2024-07-18 PROCEDURE — 99214 OFFICE O/P EST MOD 30 MIN: CPT

## 2024-07-18 ASSESSMENT — LOCATION ZONE DERM
LOCATION ZONE: FACE
LOCATION ZONE: HAND
LOCATION ZONE: TRUNK
LOCATION ZONE: SCALP
LOCATION ZONE: NECK

## 2024-07-18 ASSESSMENT — LOCATION SIMPLE DESCRIPTION DERM
LOCATION SIMPLE: RIGHT LOWER BACK
LOCATION SIMPLE: RIGHT UPPER BACK
LOCATION SIMPLE: SCALP
LOCATION SIMPLE: RIGHT HAND
LOCATION SIMPLE: CHEST
LOCATION SIMPLE: LEFT FOREHEAD
LOCATION SIMPLE: NECK
LOCATION SIMPLE: LEFT CHEEK

## 2024-07-18 ASSESSMENT — LOCATION DETAILED DESCRIPTION DERM
LOCATION DETAILED: LEFT INFERIOR CENTRAL MALAR CHEEK
LOCATION DETAILED: RIGHT DORSAL MIDDLE FINGER METACARPOPHALANGEAL JOINT
LOCATION DETAILED: LEFT FOREHEAD
LOCATION DETAILED: LEFT CENTRAL FRONTAL SCALP
LOCATION DETAILED: LEFT LATERAL SUPERIOR CHEST
LOCATION DETAILED: RIGHT SUPERIOR LATERAL NECK
LOCATION DETAILED: RIGHT SUPERIOR UPPER BACK
LOCATION DETAILED: RIGHT SUPERIOR LATERAL MIDBACK

## 2024-07-18 NOTE — PROCEDURE: PRESCRIPTION MEDICATION MANAGEMENT
Continue Regimen: Clobetasol 0.05% solution BID to affected areas throughout the body.\\nDesonide 0.05 % Topical cream PRN to affected areas on the face.
Plan: Patient will continue to use topical steroids. Can RTC with worsening concerns.
Detail Level: Zone
Discontinue Regimen: Tacrolimus 0.1 % Topical - ointment Frequency: Twice a day\\n\\nDupixent Syringe 300 mg/2 mL Subcutaneous - syringe\\n**Insurance denied coverage**\\nNot flaring on exam today.
Render In Strict Bullet Format?: No

## 2024-07-18 NOTE — PROCEDURE: PATIENT SPECIFIC COUNSELING
-Well controlled on exam today.\\n-Patient states she never picked up the Dupixent as insurance denied her coverage.\\n-Patient states she used the tacrolimus 0.1 % topical ointment on her shins and caused her to be “horribly itchy” and has not used the tacrolimus 0.1 % topical ointment since. She is unsure if the itchiness was caused from the tacrolimus 0.1 % topical ointment or if she was having a bad flare of eczema that day.\\n-Patient went back to using steroids to treat the affected areas and is well controlled.\\n-Discussed she can continue to use desonide to affected areas on her face and clobetasol to affected areas throughout her body.\\n-Recommended patient continue with 6 month FBSE.\\n-Patient agreeable with plan.
Detail Level: Zone

## 2024-07-18 NOTE — HPI: FULL BODY SKIN EXAMINATION
How Severe Are Your Spot(S)?: mild
What Type Of Note Output Would You Prefer (Optional)?: Standard Output
What Is The Reason For Today's Visit?: Full Body Skin Examination
What Is The Reason For Today's Visit? (Being Monitored For X): concerning skin lesions on a periodic basis
Additional History: Moles on right side. Noticed them a couple months ago. Have been growing

## 2024-07-18 NOTE — PROCEDURE: COUNSELING
Detail Level: Generalized
When Should The Patient Follow-Up For Their Next Full-Body Skin Exam?: 1 Year
Quality 137: Melanoma: Continuity Of Care - Recall System: Patient information entered into a recall system that includes: target date for the next exam specified AND a process to follow up with patients regarding missed or unscheduled appointments
Detail Level: Zone
When Should The Patient Follow-Up For Their Next Full-Body Skin Exam?: 6 Months
Detail Level: Detailed
Detail Level: Simple
Patient Specific Counseling (Will Not Stick From Patient To Patient): Follow up in 6 months for FBSE

## 2025-01-20 ENCOUNTER — APPOINTMENT (OUTPATIENT)
Dept: URBAN - METROPOLITAN AREA CLINIC 255 | Age: 50
Setting detail: DERMATOLOGY
End: 2025-01-21

## 2025-01-20 VITALS — HEIGHT: 62 IN | WEIGHT: 160 LBS

## 2025-01-20 DIAGNOSIS — Z85.828 PERSONAL HISTORY OF OTHER MALIGNANT NEOPLASM OF SKIN: ICD-10-CM

## 2025-01-20 DIAGNOSIS — Z71.89 OTHER SPECIFIED COUNSELING: ICD-10-CM

## 2025-01-20 DIAGNOSIS — D18.0 HEMANGIOMA: ICD-10-CM

## 2025-01-20 DIAGNOSIS — L57.8 OTHER SKIN CHANGES DUE TO CHRONIC EXPOSURE TO NONIONIZING RADIATION: ICD-10-CM

## 2025-01-20 DIAGNOSIS — Z86.006 PERSONAL HISTORY OF MELANOMA IN-SITU: ICD-10-CM

## 2025-01-20 DIAGNOSIS — Z87.2 PERSONAL HISTORY OF DISEASES OF THE SKIN AND SUBCUTANEOUS TISSUE: ICD-10-CM

## 2025-01-20 DIAGNOSIS — L82.1 OTHER SEBORRHEIC KERATOSIS: ICD-10-CM

## 2025-01-20 DIAGNOSIS — D22 MELANOCYTIC NEVI: ICD-10-CM

## 2025-01-20 DIAGNOSIS — D485 NEOPLASM OF UNCERTAIN BEHAVIOR OF SKIN: ICD-10-CM

## 2025-01-20 PROBLEM — D22.5 MELANOCYTIC NEVI OF TRUNK: Status: ACTIVE | Noted: 2025-01-20

## 2025-01-20 PROBLEM — D18.01 HEMANGIOMA OF SKIN AND SUBCUTANEOUS TISSUE: Status: ACTIVE | Noted: 2025-01-20

## 2025-01-20 PROBLEM — D22.62 MELANOCYTIC NEVI OF LEFT UPPER LIMB, INCLUDING SHOULDER: Status: ACTIVE | Noted: 2025-01-20

## 2025-01-20 PROBLEM — D22.61 MELANOCYTIC NEVI OF RIGHT UPPER LIMB, INCLUDING SHOULDER: Status: ACTIVE | Noted: 2025-01-20

## 2025-01-20 PROBLEM — D22.71 MELANOCYTIC NEVI OF RIGHT LOWER LIMB, INCLUDING HIP: Status: ACTIVE | Noted: 2025-01-20

## 2025-01-20 PROBLEM — D48.5 NEOPLASM OF UNCERTAIN BEHAVIOR OF SKIN: Status: ACTIVE | Noted: 2025-01-20

## 2025-01-20 PROBLEM — D22.72 MELANOCYTIC NEVI OF LEFT LOWER LIMB, INCLUDING HIP: Status: ACTIVE | Noted: 2025-01-20

## 2025-01-20 PROCEDURE — 56605 BIOPSY OF VULVA/PERINEUM: CPT

## 2025-01-20 PROCEDURE — OTHER COUNSELING: OTHER

## 2025-01-20 PROCEDURE — OTHER SUNSCREEN RECOMMENDATIONS: OTHER

## 2025-01-20 PROCEDURE — OTHER BIOPSY BY SHAVE METHOD: OTHER

## 2025-01-20 PROCEDURE — 99213 OFFICE O/P EST LOW 20 MIN: CPT | Mod: 25

## 2025-01-20 PROCEDURE — OTHER MIPS QUALITY: OTHER

## 2025-01-20 PROCEDURE — OTHER PATIENT SPECIFIC COUNSELING: OTHER

## 2025-01-20 ASSESSMENT — LOCATION SIMPLE DESCRIPTION DERM
LOCATION SIMPLE: UPPER BACK
LOCATION SIMPLE: LEFT UPPER ARM
LOCATION SIMPLE: LEFT FOREHEAD
LOCATION SIMPLE: LEFT LOWER BACK
LOCATION SIMPLE: LEFT THIGH
LOCATION SIMPLE: LEFT CHEEK
LOCATION SIMPLE: RIGHT FOREARM
LOCATION SIMPLE: ABDOMEN
LOCATION SIMPLE: RIGHT THIGH
LOCATION SIMPLE: GROIN
LOCATION SIMPLE: RIGHT UPPER BACK
LOCATION SIMPLE: LEFT UPPER BACK
LOCATION SIMPLE: CHEST
LOCATION SIMPLE: LEFT FOREARM

## 2025-01-20 ASSESSMENT — LOCATION DETAILED DESCRIPTION DERM
LOCATION DETAILED: RIGHT VENTRAL PROXIMAL FOREARM
LOCATION DETAILED: RIGHT PROXIMAL DORSAL FOREARM
LOCATION DETAILED: LEFT VENTRAL PROXIMAL FOREARM
LOCATION DETAILED: LEFT FOREHEAD
LOCATION DETAILED: MIDDLE STERNUM
LOCATION DETAILED: MONS PUBIS
LOCATION DETAILED: RIGHT VENTRAL DISTAL FOREARM
LOCATION DETAILED: LEFT VENTRAL DISTAL FOREARM
LOCATION DETAILED: LEFT ANTERIOR DISTAL THIGH
LOCATION DETAILED: LEFT ANTECUBITAL SKIN
LOCATION DETAILED: LEFT ANTERIOR PROXIMAL THIGH
LOCATION DETAILED: EPIGASTRIC SKIN
LOCATION DETAILED: RIGHT ANTERIOR PROXIMAL THIGH
LOCATION DETAILED: RIGHT SUPERIOR UPPER BACK
LOCATION DETAILED: LEFT MEDIAL UPPER BACK
LOCATION DETAILED: LEFT INFERIOR CENTRAL MALAR CHEEK
LOCATION DETAILED: INFERIOR THORACIC SPINE
LOCATION DETAILED: LEFT INFERIOR LATERAL MIDBACK
LOCATION DETAILED: LEFT DISTAL DORSAL FOREARM
LOCATION DETAILED: RIGHT ANTERIOR DISTAL THIGH

## 2025-01-20 ASSESSMENT — LOCATION ZONE DERM
LOCATION ZONE: VULVA
LOCATION ZONE: FACE
LOCATION ZONE: TRUNK
LOCATION ZONE: ARM
LOCATION ZONE: LEG

## 2025-01-20 NOTE — PROCEDURE: BIOPSY BY SHAVE METHOD
Detail Level: Detailed
Depth Of Biopsy: dermis
Was A Bandage Applied: Yes
Size Of Lesion In Cm: 0
Biopsy Type: H and E
Biopsy Method: Dermablade
Anesthesia Type: 1% lidocaine with epinephrine and a 1:10 solution of 8.4% sodium bicarbonate
Anesthesia Volume In Cc: 0.4
Hemostasis: Drysol and Electrocautery
Wound Care: Petrolatum
Dressing: bandage
Destruction After The Procedure: No
Type Of Destruction Used: Curettage
Curettage Text: The wound bed was treated with curettage after the biopsy was performed.
Cryotherapy Text: The wound bed was treated with cryotherapy after the biopsy was performed.
Electrodesiccation Text: The wound bed was treated with electrodesiccation after the biopsy was performed.
Electrodesiccation And Curettage Text: The wound bed was treated with electrodesiccation and curettage after the biopsy was performed.
Silver Nitrate Text: The wound bed was treated with silver nitrate after the biopsy was performed.
Lab: [id number]
Path Notes Override (Will Replace All Of The Above Text): Please check margins.
Medical Necessity Information: It is in your best interest to select a reason for this procedure from the list below. All of these items fulfill various CMS LCD requirements except the new and changing color options.
Consent: Written consent was obtained and risks were reviewed including but not limited to scarring, infection, bleeding, scabbing, incomplete removal, nerve damage and allergy to anesthesia.
Post-Care Instructions: I reviewed with the patient in detail post-care instructions. Patient is to keep the biopsy site dry overnight, and then apply bacitracin twice daily until healed. Patient may apply hydrogen peroxide soaks to remove any crusting.
Notification Instructions: Patient will be notified of biopsy results. However, patient instructed to call the office if not contacted within 2 weeks.
Billing Type: Third-Party Bill
Information: Selecting Yes will display possible errors in your note based on the variables you have selected. This validation is only offered as a suggestion for you. PLEASE NOTE THAT THE VALIDATION TEXT WILL BE REMOVED WHEN YOU FINALIZE YOUR NOTE. IF YOU WANT TO FAX A PRELIMINARY NOTE YOU WILL NEED TO TOGGLE THIS TO 'NO' IF YOU DO NOT WANT IT IN YOUR FAXED NOTE.

## 2025-07-21 ENCOUNTER — APPOINTMENT (OUTPATIENT)
Dept: URBAN - METROPOLITAN AREA CLINIC 255 | Age: 50
Setting detail: DERMATOLOGY
End: 2025-07-22

## 2025-07-21 VITALS — HEIGHT: 62 IN | WEIGHT: 150 LBS

## 2025-07-21 DIAGNOSIS — Z87.2 PERSONAL HISTORY OF DISEASES OF THE SKIN AND SUBCUTANEOUS TISSUE: ICD-10-CM

## 2025-07-21 DIAGNOSIS — D18.0 HEMANGIOMA: ICD-10-CM

## 2025-07-21 DIAGNOSIS — Z86.006 PERSONAL HISTORY OF MELANOMA IN-SITU: ICD-10-CM

## 2025-07-21 DIAGNOSIS — D22 MELANOCYTIC NEVI: ICD-10-CM

## 2025-07-21 DIAGNOSIS — Z85.828 PERSONAL HISTORY OF OTHER MALIGNANT NEOPLASM OF SKIN: ICD-10-CM

## 2025-07-21 DIAGNOSIS — L82.1 OTHER SEBORRHEIC KERATOSIS: ICD-10-CM

## 2025-07-21 DIAGNOSIS — Z71.89 OTHER SPECIFIED COUNSELING: ICD-10-CM

## 2025-07-21 DIAGNOSIS — Z85.820 PERSONAL HISTORY OF MALIGNANT MELANOMA OF SKIN: ICD-10-CM

## 2025-07-21 DIAGNOSIS — L57.8 OTHER SKIN CHANGES DUE TO CHRONIC EXPOSURE TO NONIONIZING RADIATION: ICD-10-CM

## 2025-07-21 PROBLEM — D22.61 MELANOCYTIC NEVI OF RIGHT UPPER LIMB, INCLUDING SHOULDER: Status: ACTIVE | Noted: 2025-07-21

## 2025-07-21 PROBLEM — D18.01 HEMANGIOMA OF SKIN AND SUBCUTANEOUS TISSUE: Status: ACTIVE | Noted: 2025-07-21

## 2025-07-21 PROBLEM — D22.72 MELANOCYTIC NEVI OF LEFT LOWER LIMB, INCLUDING HIP: Status: ACTIVE | Noted: 2025-07-21

## 2025-07-21 PROBLEM — D22.62 MELANOCYTIC NEVI OF LEFT UPPER LIMB, INCLUDING SHOULDER: Status: ACTIVE | Noted: 2025-07-21

## 2025-07-21 PROBLEM — D22.5 MELANOCYTIC NEVI OF TRUNK: Status: ACTIVE | Noted: 2025-07-21

## 2025-07-21 PROBLEM — D22.71 MELANOCYTIC NEVI OF RIGHT LOWER LIMB, INCLUDING HIP: Status: ACTIVE | Noted: 2025-07-21

## 2025-07-21 PROCEDURE — OTHER COUNSELING: OTHER

## 2025-07-21 PROCEDURE — OTHER SUNSCREEN RECOMMENDATIONS: OTHER

## 2025-07-21 PROCEDURE — 99213 OFFICE O/P EST LOW 20 MIN: CPT

## 2025-07-21 PROCEDURE — OTHER MIPS QUALITY: OTHER

## 2025-07-21 PROCEDURE — OTHER PATIENT SPECIFIC COUNSELING: OTHER

## 2025-07-21 ASSESSMENT — LOCATION DETAILED DESCRIPTION DERM
LOCATION DETAILED: RIGHT SUPERIOR UPPER BACK
LOCATION DETAILED: RIGHT ANTERIOR PROXIMAL THIGH
LOCATION DETAILED: RIGHT PROXIMAL DORSAL FOREARM
LOCATION DETAILED: LEFT DISTAL DORSAL FOREARM
LOCATION DETAILED: LEFT VENTRAL PROXIMAL FOREARM
LOCATION DETAILED: LEFT ANTECUBITAL SKIN
LOCATION DETAILED: LEFT ANTERIOR DISTAL THIGH
LOCATION DETAILED: LEFT INFERIOR LATERAL MIDBACK
LOCATION DETAILED: LEFT ANTERIOR PROXIMAL THIGH
LOCATION DETAILED: RIGHT VENTRAL PROXIMAL FOREARM
LOCATION DETAILED: LEFT VENTRAL DISTAL FOREARM
LOCATION DETAILED: RIGHT ANTERIOR DISTAL THIGH
LOCATION DETAILED: EPIGASTRIC SKIN
LOCATION DETAILED: TIP OF LEFT 2ND TOE
LOCATION DETAILED: LEFT MEDIAL UPPER BACK
LOCATION DETAILED: LEFT INFERIOR CENTRAL MALAR CHEEK
LOCATION DETAILED: LEFT FOREHEAD
LOCATION DETAILED: RIGHT VENTRAL DISTAL FOREARM
LOCATION DETAILED: MIDDLE STERNUM
LOCATION DETAILED: RIGHT SUPERIOR MEDIAL UPPER BACK
LOCATION DETAILED: INFERIOR THORACIC SPINE
LOCATION DETAILED: SUPERIOR THORACIC SPINE
LOCATION DETAILED: LEFT LATERAL PLANTAR 3RD TOE

## 2025-07-21 ASSESSMENT — LOCATION ZONE DERM
LOCATION ZONE: LEG
LOCATION ZONE: ARM
LOCATION ZONE: TRUNK
LOCATION ZONE: FACE
LOCATION ZONE: TOE

## 2025-07-21 ASSESSMENT — LOCATION SIMPLE DESCRIPTION DERM
LOCATION SIMPLE: PLANTAR SURFACE OF LEFT 2ND TOE
LOCATION SIMPLE: LEFT FOREHEAD
LOCATION SIMPLE: PLANTAR SURFACE OF LEFT 3RD TOE
LOCATION SIMPLE: UPPER BACK
LOCATION SIMPLE: RIGHT THIGH
LOCATION SIMPLE: ABDOMEN
LOCATION SIMPLE: LEFT UPPER ARM
LOCATION SIMPLE: LEFT LOWER BACK
LOCATION SIMPLE: LEFT UPPER BACK
LOCATION SIMPLE: LEFT FOREARM
LOCATION SIMPLE: RIGHT FOREARM
LOCATION SIMPLE: LEFT CHEEK
LOCATION SIMPLE: RIGHT UPPER BACK
LOCATION SIMPLE: CHEST
LOCATION SIMPLE: LEFT THIGH